# Patient Record
Sex: FEMALE | Race: WHITE
[De-identification: names, ages, dates, MRNs, and addresses within clinical notes are randomized per-mention and may not be internally consistent; named-entity substitution may affect disease eponyms.]

---

## 2017-02-17 PROBLEM — Z00.00 ENCOUNTER FOR PREVENTIVE HEALTH EXAMINATION: Status: ACTIVE | Noted: 2017-02-17

## 2017-08-22 ENCOUNTER — APPOINTMENT (OUTPATIENT)
Dept: OPHTHALMOLOGY | Facility: CLINIC | Age: 72
End: 2017-08-22
Payer: MEDICARE

## 2017-08-22 DIAGNOSIS — Z86.69 PERSONAL HISTORY OF OTHER DISEASES OF THE NERVOUS SYSTEM AND SENSE ORGANS: ICD-10-CM

## 2017-08-22 DIAGNOSIS — Z96.1 PRESENCE OF INTRAOCULAR LENS: ICD-10-CM

## 2017-08-22 DIAGNOSIS — H25.091 OTHER AGE-RELATED INCIPIENT CATARACT, RIGHT EYE: ICD-10-CM

## 2017-08-22 DIAGNOSIS — D18.09 HEMANGIOMA OF OTHER SITES: ICD-10-CM

## 2017-08-22 PROCEDURE — 92004 COMPRE OPH EXAM NEW PT 1/>: CPT

## 2017-08-22 RX ORDER — LEVOTHYROXINE SODIUM 137 UG/1
TABLET ORAL
Refills: 0 | Status: ACTIVE | COMMUNITY

## 2017-08-22 RX ORDER — ATORVASTATIN CALCIUM 10 MG/1
10 TABLET, FILM COATED ORAL
Refills: 0 | Status: ACTIVE | COMMUNITY

## 2017-08-22 RX ORDER — SERTRALINE HYDROCHLORIDE 25 MG/1
TABLET, FILM COATED ORAL
Refills: 0 | Status: ACTIVE | COMMUNITY

## 2017-08-23 ENCOUNTER — HOSPITAL ENCOUNTER (EMERGENCY)
Dept: HOSPITAL 74 - FER | Age: 72
Discharge: HOME | End: 2017-08-23
Payer: COMMERCIAL

## 2017-08-23 DIAGNOSIS — R21: Primary | ICD-10-CM

## 2017-09-10 ENCOUNTER — HOSPITAL ENCOUNTER (EMERGENCY)
Dept: HOSPITAL 74 - FER | Age: 72
Discharge: HOME | End: 2017-09-10
Payer: COMMERCIAL

## 2017-09-10 VITALS — DIASTOLIC BLOOD PRESSURE: 49 MMHG | HEART RATE: 55 BPM | SYSTOLIC BLOOD PRESSURE: 116 MMHG

## 2017-09-10 VITALS — BODY MASS INDEX: 25.6 KG/M2

## 2017-09-10 VITALS — TEMPERATURE: 97.8 F

## 2017-09-10 DIAGNOSIS — Z91.038: ICD-10-CM

## 2017-09-10 DIAGNOSIS — E78.00: ICD-10-CM

## 2017-09-10 DIAGNOSIS — E03.9: ICD-10-CM

## 2017-09-10 DIAGNOSIS — T63.441A: Primary | ICD-10-CM

## 2017-09-10 DIAGNOSIS — F41.9: ICD-10-CM
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
## 2017-09-10 DIAGNOSIS — T78.3XXA: ICD-10-CM

## 2017-09-10 DIAGNOSIS — Z88.2: ICD-10-CM

## 2017-09-10 PROCEDURE — 3E0333Z INTRODUCTION OF ANTI-INFLAMMATORY INTO PERIPHERAL VEIN, PERCUTANEOUS APPROACH: ICD-10-PCS | Performed by: STUDENT IN AN ORGANIZED HEALTH CARE EDUCATION/TRAINING PROGRAM

## 2017-09-10 PROCEDURE — 3E033GC INTRODUCTION OF OTHER THERAPEUTIC SUBSTANCE INTO PERIPHERAL VEIN, PERCUTANEOUS APPROACH: ICD-10-PCS | Performed by: STUDENT IN AN ORGANIZED HEALTH CARE EDUCATION/TRAINING PROGRAM

## 2017-09-10 NOTE — PDOC
History of Present Illness





- General


Chief Complaint: Bite


Stated Complaint: beesting


Time Seen by Provider: 09/10/17 15:17


History Source: Patient


Exam Limitations: No Limitations





- History of Present Illness


Initial Comments: 


This is a 71 yo female with h/o bee allergy with throat swelling who presents 

about 45 min after being stung by a bee in her left nostril and on her right 

hand. She has left-sided upper lip swelling which has been worsening since the 

incident. She denies throat swelling, tongue swelling, difficulty breathing, 

difficulty speaking, difficulty swallowing, nausea, vomiting, diarrhea, headache

, dizziness, vision changes, new rash, or other symptoms. She took a 25 mg 

Benadryl PO without relief shortly after the incident. She has an old  

EpiPen but did not use it.





Past History





- Past Medical History


Allergies/Adverse Reactions: 


 Allergies











Allergy/AdvReac Type Severity Reaction Status Date / Time


 


bee pollen Allergy   Verified 09/10/17 15:15


 


Sulfa (Sulfonamide Allergy   Verified 09/10/17 15:15





Antibiotics)     











Home Medications: 


Ambulatory Orders





Diphenhydramine HCl [Benadryl -] 25 mg PO ONCE PRN 09/10/17 


Epinephrine [Adrenaclick] 0.3 mg IJ ONCE PRN #1 auto.injct 09/10/17 


Prednisone [Deltasone -] 40 mg PO DAILY #14 tablet 09/10/17 











**Review of Systems





- Review of Systems


Constitutional: No: Chills, Fever, Unexplained wgt Loss


HEENTM: Yes: Other (lip swelling).  No: Nose Congestion, Throat Pain, Throat 

Swelling, Difficulty Swallowing


Respiratory: No: Cough, Shortness of Breath, Wheezing


Cardiac (ROS): No: Chest Pain, Palpitations


ABD/GI: No: Constipated, Diarrhea, Nausea, Vomiting


: No: Burning, Dysuria


Musculoskeletal: No: Back Pain, Neck Pain


Integumentary: Yes: Other (recent shingles).  No: Bruising


Neurological: No: Headache, Numbness, Tingling, Weakness, Dizziness


Endocrine: No: Unexplained Weight Gain, Unexplained Weight Loss





*Physical Exam





- Physical Exam


General Appearance: Yes: Nourished, Appropriately Dressed, Mild Distress, Other 

(pleasant older woman who is conversive and appropriately answering questions)


HEENT: positive: EOMI, Normal Voice, Hearing Grossly Normal, Other (bilateral 

upper labial redness and swelling extending to the left mid-cheek (worsened 

from presentation at triage of isolated left upper labial redness and swelling))

.  negative: Scleral Icterus (R), Scleral Icterus (L), Nasal Congestion


Neck: positive: Trachea midline, Supple.  negative: Tender, Rigid


Respiratory/Chest: positive: Lungs Clear, Normal Breath Sounds.  negative: 

Respiratory Distress, Crackles, Rhonchi, Stridor, Wheezing


Cardiovascular: positive: Regular Rhythm, Regular Rate.  negative: Murmur


Gastrointestinal/Abdominal: positive: Normal Bowel Sounds, Soft.  negative: 

Tender, Organomegaly, Pulsatile Mass, Guarding


Musculoskeletal: positive: Normal Inspection.  negative: Decreased Range of 

Motion, Vertebral Tenderness


Extremity: positive: Normal Capillary Refill, Normal Inspection, Normal Range 

of Motion.  negative: Tender, Cyanosis


Integumentary: positive: Normal Color, Dry, Warm.  negative: Rash, Bruising


Neurologic: positive: CNs II-XII NML intact, Fully Oriented, Alert, Normal Mood/

Affect, Normal Response, Motor Strength 5/5





Medical Decision Making





- Medical Decision Making


72 yof with bee allergy presents with allergic reaction about 45 min after two 

bee stings.


Stung on the left nostril and right hand, took a 25mg Benadryl, still had onset 

of upper lip and cheek swelling.


On initial exam she has lip swelling and redness but no other symptoms.


No wheezing, difficulty breathing, nausea/vomiting, dizziness, lightheadedness, 

headache, etc.


Most likely this is simple allergic reaction with angioedema, less likely will 

progress to anaphylaxis but will observe closely.


Ordered is IB Benadryl, SoluMedrol, famotidine.





The patient is observed in the department for >2 hours.


Her swelling has decreased since the administration of benadyrl, solumedrol, 

and famotidine.


She has denied any additional symptoms aside from the upper lip swelling.


She is appropriate for discharge home with her .


Return precautions are discussed and she will come back to ED for any new/

worsening symptoms.


She is prescribed a new EpiPen and discharged home.





*DC/Admit/Observation/Transfer


Diagnosis at time of Disposition: 


Angioedema


Qualifiers:


 Encounter type: initial encounter Qualified Code(s): T78.3XXA - Angioneurotic 

edema, initial encounter





Bee sting reaction


Qualifiers:


 Encounter type: initial encounter Injury intent: accidental or unintentional 

Qualified Code(s): T63.441A - Toxic effect of venom of bees, accidental (

unintentional), initial encounter





- Discharge Dispostion


Disposition: HOME


Condition at time of disposition: Stable


Admit: No





- Prescriptions


Prescriptions: 


Epinephrine [Adrenaclick] 0.3 mg IJ ONCE PRN #1 auto.injct


 PRN Reason: Wheezing


Prednisone [Deltasone -] 40 mg PO DAILY #14 tablet





- Patient Instructions


Printed Discharge Instructions:  DI for General Allergic Reactions, DI for 

Angioedema


Additional Instructions: 


You were seen in the ED today for lip swelling after bee stings. You did not 

have throat swelling or other symptoms concerning for a serious reaction like 

anaphylaxis. We gave you IV Benadryl, IV Pepcid, and an IV steroid which did 

help with your symptoms. We are sending a prescription for a generic EpiPen to 

your pharmacy in case you need it in the future. We are also sending a 

prescription for Prednisone 40 mg a day to your pharmacy. Please take this for 

only 3 days. Please follow up with your regular doctor as needed, or return to 

the ED with new or worsening symptoms like wheezing, difficulty breathing, 

throat swelling, tongue swelling, nausea, vomiting diarrhea, rash on your body, 

or low blood pressure/severe lightheadedness.

## 2017-09-10 NOTE — PDOC
Attending Attestation





- Resident


Resident Name: [email or web address]





- ED Attending Attestation


I have performed the following: I have examined & evaluated the patient, The 

case was reviewed & discussed with the resident, I agree w/resident's findings 

& plan, Exceptions are as noted





- HPI


HPI: 


09/10/17 15:22


72y F hx of anxiety, hl, hypothyroidism presents for evaluation s/p bee sting. 

pt endorses swelling to her upper lip after a bee sting. no respiratory 

complaints including sob, voice changes, wheezing, abd pain/n/v, 

lightheadedness.  


on exam pt has angioedema to the upper lip, posterior pharynx is widely patent. 


lung exam normal without wheezing


abd exam w/o any tendneress





will give pt med therapy


will oberve and if not worsening willdc 


consider epi if owrse or posterior phyarngeal involvement 








09/10/17 16:22


pts swelling significantly improved


will dc with pmd fu


return precautions were discussed











- Physicial Exam


PE: 





09/12/17 05:22


jamee petersen





- Medical Decision Making





09/12/17 05:22


see above

## 2021-01-22 ENCOUNTER — TRANSCRIPTION ENCOUNTER (OUTPATIENT)
Age: 76
End: 2021-01-22

## 2023-03-01 ENCOUNTER — NON-APPOINTMENT (OUTPATIENT)
Age: 78
End: 2023-03-01

## 2024-10-23 ENCOUNTER — APPOINTMENT (OUTPATIENT)
Dept: OPHTHALMOLOGY | Facility: CLINIC | Age: 79
End: 2024-10-23
Payer: MEDICARE

## 2024-10-23 ENCOUNTER — NON-APPOINTMENT (OUTPATIENT)
Age: 79
End: 2024-10-23

## 2024-10-23 PROCEDURE — 92083 EXTENDED VISUAL FIELD XM: CPT

## 2024-10-23 PROCEDURE — 76512 OPH US DX B-SCAN: CPT | Mod: LT

## 2024-10-23 PROCEDURE — 99204 OFFICE O/P NEW MOD 45 MIN: CPT

## 2024-10-23 PROCEDURE — 92060 SENSORIMOTOR EXAMINATION: CPT
